# Patient Record
Sex: FEMALE
[De-identification: names, ages, dates, MRNs, and addresses within clinical notes are randomized per-mention and may not be internally consistent; named-entity substitution may affect disease eponyms.]

---

## 2018-05-02 ENCOUNTER — APPOINTMENT (OUTPATIENT)
Dept: OBGYN | Facility: CLINIC | Age: 48
End: 2018-05-02
Payer: COMMERCIAL

## 2018-05-02 VITALS
HEIGHT: 62 IN | BODY MASS INDEX: 29.26 KG/M2 | SYSTOLIC BLOOD PRESSURE: 112 MMHG | DIASTOLIC BLOOD PRESSURE: 70 MMHG | WEIGHT: 159 LBS

## 2018-05-02 DIAGNOSIS — Z87.2 PERSONAL HISTORY OF DISEASES OF THE SKIN AND SUBCUTANEOUS TISSUE: ICD-10-CM

## 2018-05-02 DIAGNOSIS — Z80.0 FAMILY HISTORY OF MALIGNANT NEOPLASM OF DIGESTIVE ORGANS: ICD-10-CM

## 2018-05-02 DIAGNOSIS — Z80.3 FAMILY HISTORY OF MALIGNANT NEOPLASM OF BREAST: ICD-10-CM

## 2018-05-02 DIAGNOSIS — Z80.42 FAMILY HISTORY OF MALIGNANT NEOPLASM OF PROSTATE: ICD-10-CM

## 2018-05-02 PROCEDURE — 36415 COLL VENOUS BLD VENIPUNCTURE: CPT

## 2018-05-02 PROCEDURE — 99396 PREV VISIT EST AGE 40-64: CPT

## 2018-05-07 LAB — CYTOLOGY CVX/VAG DOC THIN PREP: NORMAL

## 2018-06-27 ENCOUNTER — APPOINTMENT (OUTPATIENT)
Dept: HEPATOLOGY | Facility: CLINIC | Age: 48
End: 2018-06-27
Payer: COMMERCIAL

## 2018-06-27 VITALS
HEIGHT: 62 IN | WEIGHT: 157 LBS | DIASTOLIC BLOOD PRESSURE: 80 MMHG | RESPIRATION RATE: 13 BRPM | TEMPERATURE: 97.8 F | HEART RATE: 75 BPM | BODY MASS INDEX: 28.89 KG/M2 | OXYGEN SATURATION: 97 % | SYSTOLIC BLOOD PRESSURE: 121 MMHG

## 2018-06-27 DIAGNOSIS — Z86.39 PERSONAL HISTORY OF OTHER ENDOCRINE, NUTRITIONAL AND METABOLIC DISEASE: ICD-10-CM

## 2018-06-27 DIAGNOSIS — R73.03 PREDIABETES.: ICD-10-CM

## 2018-06-27 DIAGNOSIS — Q61.5 MEDULLARY CYSTIC KIDNEY: ICD-10-CM

## 2018-06-27 LAB
BASOPHILS # BLD AUTO: 0.04 K/UL
BASOPHILS NFR BLD AUTO: 0.7 %
EOSINOPHIL # BLD AUTO: 0.22 K/UL
EOSINOPHIL NFR BLD AUTO: 3.9 %
HCT VFR BLD CALC: 41.9 %
HGB BLD-MCNC: 13.7 G/DL
IMM GRANULOCYTES NFR BLD AUTO: 0.2 %
INR PPP: 0.97 RATIO
LYMPHOCYTES # BLD AUTO: 1.75 K/UL
LYMPHOCYTES NFR BLD AUTO: 31 %
MAN DIFF?: NORMAL
MCHC RBC-ENTMCNC: 28.5 PG
MCHC RBC-ENTMCNC: 32.7 GM/DL
MCV RBC AUTO: 87.1 FL
MONOCYTES # BLD AUTO: 0.56 K/UL
MONOCYTES NFR BLD AUTO: 9.9 %
NEUTROPHILS # BLD AUTO: 3.07 K/UL
NEUTROPHILS NFR BLD AUTO: 54.3 %
PLATELET # BLD AUTO: 285 K/UL
PT BLD: 11 SEC
RBC # BLD: 4.81 M/UL
RBC # FLD: 12.7 %
WBC # FLD AUTO: 5.65 K/UL

## 2018-06-27 PROCEDURE — 99204 OFFICE O/P NEW MOD 45 MIN: CPT

## 2018-06-28 LAB
25(OH)D3 SERPL-MCNC: 23.2 NG/ML
ACE BLD-CCNC: 44 U/L
AFP-TM SERPL-MCNC: 6.6 NG/ML
ALBUMIN SERPL ELPH-MCNC: 4.3 G/DL
ALP BLD-CCNC: 130 U/L
ALT SERPL-CCNC: 38 U/L
ANA SER IF-ACNC: NEGATIVE
ANION GAP SERPL CALC-SCNC: 13 MMOL/L
AST SERPL-CCNC: 32 U/L
BILIRUB SERPL-MCNC: 0.3 MG/DL
BUN SERPL-MCNC: 8 MG/DL
CALCIUM SERPL-MCNC: 9.6 MG/DL
CHLORIDE SERPL-SCNC: 103 MMOL/L
CO2 SERPL-SCNC: 22 MMOL/L
CREAT SERPL-MCNC: 0.77 MG/DL
IGA SER QL IEP: 182 MG/DL
IGG SER QL IEP: 1400 MG/DL
IGM SER QL IEP: 76 MG/DL
MITOCHONDRIA AB SER IF-ACNC: NORMAL
POTASSIUM SERPL-SCNC: 3.6 MMOL/L
PROT SERPL-MCNC: 7.6 G/DL
SMOOTH MUSCLE AB SER QL IF: NORMAL
SODIUM SERPL-SCNC: 138 MMOL/L
TTG IGA SER IA-ACNC: 12.7 UNITS
TTG IGA SER-ACNC: NEGATIVE
TTG IGG SER IA-ACNC: <5 UNITS
TTG IGG SER IA-ACNC: NEGATIVE

## 2018-06-29 LAB
ENA SS-A AB SER IA-ACNC: <0.2 AL
ENA SS-B AB SER IA-ACNC: <0.2 AL
HDV AB SER IA-ACNC: NEGATIVE
LKM AB SER QL IF: 1.5 UNITS
SOLUBLE LIVER IGG SER IA-ACNC: < 20.1 UNITS

## 2018-07-09 LAB
A1AT PHENOTYP SERPL-IMP: NORMAL BANDS
A1AT SERPL-MCNC: 147 MG/DL
HEV AB SER QL: NEGATIVE
VIT A SERPL-MCNC: 31.5 UG/DL

## 2018-08-16 ENCOUNTER — APPOINTMENT (OUTPATIENT)
Dept: HEPATOLOGY | Facility: CLINIC | Age: 48
End: 2018-08-16
Payer: COMMERCIAL

## 2018-08-16 VITALS
SYSTOLIC BLOOD PRESSURE: 107 MMHG | BODY MASS INDEX: 29.62 KG/M2 | DIASTOLIC BLOOD PRESSURE: 76 MMHG | OXYGEN SATURATION: 98 % | HEART RATE: 69 BPM | WEIGHT: 163 LBS | RESPIRATION RATE: 13 BRPM | HEIGHT: 62.4 IN | TEMPERATURE: 98.3 F

## 2018-08-16 PROCEDURE — 91200 LIVER ELASTOGRAPHY: CPT

## 2018-08-16 PROCEDURE — ZZZZZ: CPT

## 2018-08-16 PROCEDURE — 99214 OFFICE O/P EST MOD 30 MIN: CPT | Mod: 25

## 2018-10-01 ENCOUNTER — APPOINTMENT (OUTPATIENT)
Dept: HEPATOLOGY | Facility: CLINIC | Age: 48
End: 2018-10-01
Payer: COMMERCIAL

## 2018-10-01 PROCEDURE — 90471 IMMUNIZATION ADMIN: CPT

## 2018-10-01 PROCEDURE — 90632 HEPA VACCINE ADULT IM: CPT

## 2019-02-27 ENCOUNTER — APPOINTMENT (OUTPATIENT)
Dept: HEPATOLOGY | Facility: CLINIC | Age: 49
End: 2019-02-27
Payer: COMMERCIAL

## 2019-02-27 VITALS
SYSTOLIC BLOOD PRESSURE: 111 MMHG | TEMPERATURE: 98.2 F | BODY MASS INDEX: 26.13 KG/M2 | WEIGHT: 142 LBS | HEIGHT: 62 IN | RESPIRATION RATE: 17 BRPM | DIASTOLIC BLOOD PRESSURE: 74 MMHG | HEART RATE: 65 BPM

## 2019-02-27 PROCEDURE — 99213 OFFICE O/P EST LOW 20 MIN: CPT

## 2019-03-29 ENCOUNTER — APPOINTMENT (OUTPATIENT)
Dept: HEPATOLOGY | Facility: CLINIC | Age: 49
End: 2019-03-29
Payer: COMMERCIAL

## 2019-03-29 VITALS
BODY MASS INDEX: 25.76 KG/M2 | RESPIRATION RATE: 17 BRPM | WEIGHT: 140 LBS | HEIGHT: 62 IN | TEMPERATURE: 98.3 F | HEART RATE: 69 BPM | DIASTOLIC BLOOD PRESSURE: 62 MMHG | SYSTOLIC BLOOD PRESSURE: 111 MMHG

## 2019-03-29 PROCEDURE — 90471 IMMUNIZATION ADMIN: CPT

## 2019-03-29 PROCEDURE — 90632 HEPA VACCINE ADULT IM: CPT

## 2019-04-01 ENCOUNTER — APPOINTMENT (OUTPATIENT)
Dept: HEPATOLOGY | Facility: CLINIC | Age: 49
End: 2019-04-01

## 2019-08-09 ENCOUNTER — LABORATORY RESULT (OUTPATIENT)
Age: 49
End: 2019-08-09

## 2019-08-11 LAB
ALBUMIN SERPL ELPH-MCNC: 4.7 G/DL
ALP BLD-CCNC: 100 U/L
ALT SERPL-CCNC: 13 U/L
ANION GAP SERPL CALC-SCNC: 10 MMOL/L
AST SERPL-CCNC: 14 U/L
BASOPHILS # BLD AUTO: 0.04 K/UL
BASOPHILS NFR BLD AUTO: 0.8 %
BILIRUB SERPL-MCNC: 0.2 MG/DL
BUN SERPL-MCNC: 10 MG/DL
CALCIUM SERPL-MCNC: 9.8 MG/DL
CHLORIDE SERPL-SCNC: 107 MMOL/L
CO2 SERPL-SCNC: 24 MMOL/L
CREAT SERPL-MCNC: 0.79 MG/DL
EOSINOPHIL # BLD AUTO: 0.13 K/UL
EOSINOPHIL NFR BLD AUTO: 2.7 %
HCT VFR BLD CALC: 42 %
HGB BLD-MCNC: 13.3 G/DL
IMM GRANULOCYTES NFR BLD AUTO: 0.2 %
LYMPHOCYTES # BLD AUTO: 1.68 K/UL
LYMPHOCYTES NFR BLD AUTO: 34.4 %
MAN DIFF?: NORMAL
MCHC RBC-ENTMCNC: 28.1 PG
MCHC RBC-ENTMCNC: 31.7 GM/DL
MCV RBC AUTO: 88.8 FL
MONOCYTES # BLD AUTO: 0.41 K/UL
MONOCYTES NFR BLD AUTO: 8.4 %
NEUTROPHILS # BLD AUTO: 2.61 K/UL
NEUTROPHILS NFR BLD AUTO: 53.5 %
PLATELET # BLD AUTO: 267 K/UL
POTASSIUM SERPL-SCNC: 5 MMOL/L
PROT SERPL-MCNC: 7.1 G/DL
RBC # BLD: 4.73 M/UL
RBC # FLD: 12.7 %
SODIUM SERPL-SCNC: 141 MMOL/L
WBC # FLD AUTO: 4.88 K/UL

## 2019-08-12 LAB — HEPATITIS A IGG ANTIBODY: REACTIVE

## 2019-08-28 ENCOUNTER — APPOINTMENT (OUTPATIENT)
Dept: HEPATOLOGY | Facility: CLINIC | Age: 49
End: 2019-08-28
Payer: COMMERCIAL

## 2019-08-28 VITALS
HEART RATE: 71 BPM | DIASTOLIC BLOOD PRESSURE: 69 MMHG | WEIGHT: 145 LBS | RESPIRATION RATE: 16 BRPM | HEIGHT: 62 IN | SYSTOLIC BLOOD PRESSURE: 103 MMHG | BODY MASS INDEX: 26.68 KG/M2 | TEMPERATURE: 98 F

## 2019-08-28 DIAGNOSIS — R74.8 ABNORMAL LEVELS OF OTHER SERUM ENZYMES: ICD-10-CM

## 2019-08-28 PROCEDURE — 99213 OFFICE O/P EST LOW 20 MIN: CPT | Mod: 25

## 2019-08-28 PROCEDURE — 91200 LIVER ELASTOGRAPHY: CPT

## 2019-08-28 PROCEDURE — ZZZZZ: CPT

## 2019-08-28 RX ORDER — LEVOCETIRIZINE DIHYDROCHLORIDE 5 MG/1
5 TABLET ORAL
Refills: 0 | Status: DISCONTINUED | COMMUNITY
End: 2019-08-28

## 2019-08-28 RX ORDER — ERGOCALCIFEROL 1.25 MG/1
1.25 MG CAPSULE, LIQUID FILLED ORAL
Qty: 4 | Refills: 0 | Status: DISCONTINUED | COMMUNITY
Start: 2017-09-13 | End: 2019-08-28

## 2019-08-28 RX ORDER — CHOLECALCIFEROL (VITAMIN D3) 25 MCG
TABLET ORAL
Refills: 0 | Status: DISCONTINUED | COMMUNITY
End: 2019-08-28

## 2019-08-28 NOTE — CONSULT LETTER
[Dear  ___] : Dear  [unfilled], [Please see my note below.] : Please see my note below. [Courtesy Letter:] : I had the pleasure of seeing your patient, [unfilled], in my office today. [Consult Closing:] : Thank you very much for allowing me to participate in the care of this patient.  If you have any questions, please do not hesitate to contact me. [FreeTextEntry2] : Dr. Dowd [FreeTextEntry3] : Sincerely,\par \par Delio Hinojosa M.D., Ph.D.\par [facility]\par [address]\[place] [address]\par Tel: [phone]\par Fax: [phone]\par Cell: [phone]\par E-mail: [email or web address]\par

## 2019-08-28 NOTE — ASSESSMENT
[FreeTextEntry1] : 50 yo F with pre-diabetes, overweight BMI, and hypothyroidism, with nonalcoholic fatty liver (NAFL), diagnosed based on mildly elevated alkaline phosphatase on prior labs, sonographic evidence of hepatic steatosis (on US done on 18), and prior FibroScan (done on 18) that was consistent with severe steatosis without significant hepatic fibrosis. She has a family history notable for a mother that  of cirrhosis and a sister with "fatty liver".\par \par Laboratory work-up for other etiologies of chronic liver disease was all negative.\par \par She has lost weight since her initial diagnosis with diet and exercise, and her alkaline phosphatase has since normalized. She also has evidence of decreased severity of hepatic steatosis based on her follow-up FibroScan done today. I congratulated her on those achievements.\par \par I have discussed with her at length regarding nonalcoholic fatty liver disease (NAFLD). I reviewed the natural history, evaluation and staging of the disease including her prior and recent FibroScan results, and prognosis, including possible risks of development of nonalcoholic steatohepatitis (BELLE), compensated cirrhosis, decompensated cirrhosis, and HCC.\par \par I have discussed with her that lifestyle modifications are crucial for management of NAFLD. I encouraged her to continue to gradually lose weight (especially given her recent 5-lb weight gain) with a goal weight of 135 lbs. She can continue her preferred "keto" diet but I also encouraged her to consider a Mediterranean-style diet. She should continue moderate intensity exercise for 20 minutes at least 3 times per week. These changes have been shown to lead to regression or even resolution of steatosis, inflammation, and even fibrosis in some patients.\par \par I have reviewed with her the fact that currently, there are no FDA-approved pharmacologic treatments for NAFLD, but that this may change within the next 1-2 years as a number of promising drugs are currently being investigated in clinical trials. She is not currently a candidate for any enrolling clinical trials given the minimal fibrosis suggested based on her FibroScan, as well as normal liver tests. She will continue vitamin E therapy for now.\par \par She is now immune to HAV. Prior labs from 3/2018 showed HBsAg negative and HBsAb negative. Will check HBcAb with next labs and, if non-immune, she will be offered the vaccine series.\par \par Ms. INTERIANO was counseled to: abstain from alcohol and all illicit drugs; avoid use of herbal and dietary supplements due to potential hepatotoxicity; and limit use of acetaminophen to <2 grams per day.\par \par She will return for follow-up and repeat FibroScan in 6 months.\par

## 2019-08-28 NOTE — HISTORY OF PRESENT ILLNESS
[de-identified] : Ms. Gabriel is a 50 yo F with pre-diabetes, overweight BMI, and hypothyroidism, who is being seen for follow-up of nonalcoholic fatty liver (NAFL), diagnosed based on mildly elevated alkaline phosphatase on labs, sonographic evidence of hepatic steatosis (on US done on 18), and prior FibroScan (done on 18) that was consistent with severe steatosis without significant hepatic fibrosis. She has a family history notable for a mother that  of cirrhosis and a sister with "fatty liver".\par \par FibroScan (18): median liver stiffness of 5.6 kPA (consistent with F0-1 fibrosis), CAP score 322 dB/m (consistent with S3 steatosis)\par \par She was last seen on 19 and is here for follow-up. She successfully lost almost 20 lbs since 2018 (when she weighed 159 lbs), to low weight of 140 lbs on 3/29/19. She has gained 5 lbs since then, which she attributes to not adhering to her diet while on summer vacation. Previously, she had been adhering to the "keto diet" including fasting in the mornings, only eating during certain hours, and avoiding sugary foods and carbohydrates. She had also been exercising regularly. She completed her HAV vaccine series on 3/29/19.\par \par She underwent FibroScan today that showed median liver stiffness of 4.5 kPA (consistent with F0-1 fibrosis) and CAP score of 270 dB/m (consistent with S2 steatosis).

## 2020-07-21 ENCOUNTER — APPOINTMENT (OUTPATIENT)
Dept: HEPATOLOGY | Facility: CLINIC | Age: 50
End: 2020-07-21

## 2020-07-21 ENCOUNTER — APPOINTMENT (OUTPATIENT)
Dept: HEPATOLOGY | Facility: CLINIC | Age: 50
End: 2020-07-21
Payer: COMMERCIAL

## 2020-07-21 PROCEDURE — 91200 LIVER ELASTOGRAPHY: CPT

## 2020-07-28 ENCOUNTER — LABORATORY RESULT (OUTPATIENT)
Age: 50
End: 2020-07-28

## 2020-08-31 ENCOUNTER — APPOINTMENT (OUTPATIENT)
Dept: HEPATOLOGY | Facility: CLINIC | Age: 50
End: 2020-08-31
Payer: COMMERCIAL

## 2020-08-31 VITALS
BODY MASS INDEX: 26.68 KG/M2 | WEIGHT: 145 LBS | RESPIRATION RATE: 16 BRPM | HEART RATE: 89 BPM | TEMPERATURE: 98 F | SYSTOLIC BLOOD PRESSURE: 130 MMHG | HEIGHT: 62 IN | DIASTOLIC BLOOD PRESSURE: 70 MMHG

## 2020-08-31 PROCEDURE — 99213 OFFICE O/P EST LOW 20 MIN: CPT

## 2020-08-31 NOTE — REVIEW OF SYSTEMS
[Negative] : Heme/Lymph [As Noted in HPI] : as noted in HPI [Recent Weight Gain (___ Lbs)] : recent [unfilled] ~Ulb weight gain

## 2020-08-31 NOTE — CONSULT LETTER
[Dear  ___] : Dear  [unfilled], [Courtesy Letter:] : I had the pleasure of seeing your patient, [unfilled], in my office today. [Please see my note below.] : Please see my note below. [Consult Closing:] : Thank you very much for allowing me to participate in the care of this patient.  If you have any questions, please do not hesitate to contact me. [FreeTextEntry2] : Dr. Carlyn Dowd [FreeTextEntry3] : Sincerely,\par \par Delio Hinojosa M.D., Ph.D.\par [facility]\par [address]\[place] [address]\par Tel: [phone]\par Fax: [phone]\par Cell: [phone]\par E-mail: [email or web address]\par

## 2020-08-31 NOTE — HISTORY OF PRESENT ILLNESS
[de-identified] : Ms. Gabriel is a 51 yo F with pre-diabetes, overweight BMI, and hypothyroidism, who is being seen for follow-up of nonalcoholic fatty liver disease (NAFLD), diagnosed based on mildly elevated alkaline phosphatase on prior labs, sonographic evidence of hepatic steatosis (on US done on 18), and prior FibroScan results. She has a family history notable for a mother that  of cirrhosis and a sister with "fatty liver".\par \par FibroScan (18): median liver stiffness of 5.6 kPA (consistent with F0-1 fibrosis), CAP score 322 dB/m (consistent with S3 steatosis, using cut-offs at that time)\par \par FibroScan (19): median liver stiffness of 4.5 kPA (consistent with F0-1 fibrosis), CAP score of 270 dB/M (consistent with S2 steatosis, using cut-offs at that time)\par \par She was last seen on 19. She regained a few pounds during the COVID- pandemic, as she has not been adhering to her prior "keto diet" as consistently. She has not been exercising, though she has a "home gym".\par \par She recently underwent FibroScan on 20 that showed median liver stiffness of 3.0 kPA (consistent with F0-1 fibrosis) and CAP score of 275 dB/m (consistent with S0 steatosis using updated cut-offs).

## 2020-08-31 NOTE — ASSESSMENT
[FreeTextEntry1] : 51 yo F with pre-diabetes, overweight BMI, and hypothyroidism, with family history of liver disease and with evidence of nonalcoholic fatty liver disease (NAFLD) based on prior liver enzyme elevations, US with hepatic steatosis, and FibroScan results. Laboratory work-up for other etiologies of chronic liver disease was all negative.\par \par With lifestyle modifications, her prior alkaline phosphatase elevations have normalized, including on her most recent labs from 7/21/20. Her liver synthetic function remains normal and she does not have any significant hepatic fibrosis based on liver stiffness by FibroScans. She also has evidence of decreased severity of hepatic steatosis based on her follow-up FibroScans (compared to 2018), though I cautioned her that she may still have some degree of hepatic steatosis present (likely <10%) despite her most recent FibroScan result and the updated cut-off for CAP score that is now being used. I have ordered an abdominal US today for re-evaluation for hepatic steatosis.\par \par We discussed the natural history, evaluation and staging of the disease including FibroScan, and prognosis, including possible risks of development of compensated cirrhosis, decompensated cirrhosis, and hepatocellular carcinoma (HCC) as well as increased risks of cardiovascular disease and non-hepatic malignancies.\par \par I encouraged her to re-commit to resuming her previously-successful lifestyle modifications, which are crucial for management of NAFLD. I recommended gradual weight loss of 5 lbs. I recommended dietary changes including coffee consumption (up to 3-4 cups/day if desired), adherence to a Mediterranean style diet with increased consumption of vegetables and lean proteins, avoidance of red meat and high fructose corn syrup, and avoidance of calorie-containing beverages. I recommended moderate intensity exercise for a minimum of 20-30 minutes at least 3 times per week. These changes have been shown to lead to regression or even resolution of steatosis, inflammation, and even fibrosis in some patients.\par \par She is now immune to HAV. She is non-immune to HBV.\par \par Ms. INTERIANO was counseled to: abstain from alcohol and all illicit drugs; avoid use of herbal and dietary supplements due to potential hepatotoxicity; and limit use of acetaminophen to <2 grams per day.\par \par She will return for follow-up and repeat FibroScan in 1 year, with labs in 6 months and again at next visit.

## 2021-01-20 ENCOUNTER — APPOINTMENT (OUTPATIENT)
Dept: OBGYN | Facility: CLINIC | Age: 51
End: 2021-01-20
Payer: COMMERCIAL

## 2021-01-20 VITALS
BODY MASS INDEX: 27.6 KG/M2 | DIASTOLIC BLOOD PRESSURE: 80 MMHG | SYSTOLIC BLOOD PRESSURE: 110 MMHG | HEIGHT: 62 IN | WEIGHT: 150 LBS

## 2021-01-20 PROCEDURE — 99072 ADDL SUPL MATRL&STAF TM PHE: CPT

## 2021-01-20 PROCEDURE — 99396 PREV VISIT EST AGE 40-64: CPT

## 2021-01-25 LAB — CYTOLOGY CVX/VAG DOC THIN PREP: NORMAL

## 2021-03-16 ENCOUNTER — OUTPATIENT (OUTPATIENT)
Dept: OUTPATIENT SERVICES | Facility: HOSPITAL | Age: 51
LOS: 1 days | End: 2021-03-16
Payer: COMMERCIAL

## 2021-03-16 ENCOUNTER — APPOINTMENT (OUTPATIENT)
Dept: ULTRASOUND IMAGING | Facility: CLINIC | Age: 51
End: 2021-03-16

## 2021-03-16 ENCOUNTER — TRANSCRIPTION ENCOUNTER (OUTPATIENT)
Age: 51
End: 2021-03-16

## 2021-03-16 ENCOUNTER — RESULT REVIEW (OUTPATIENT)
Age: 51
End: 2021-03-16

## 2021-03-16 DIAGNOSIS — K76.0 FATTY (CHANGE OF) LIVER, NOT ELSEWHERE CLASSIFIED: ICD-10-CM

## 2021-03-16 PROCEDURE — 76700 US EXAM ABDOM COMPLETE: CPT | Mod: 26

## 2021-03-16 PROCEDURE — 76700 US EXAM ABDOM COMPLETE: CPT

## 2021-07-08 ENCOUNTER — NON-APPOINTMENT (OUTPATIENT)
Age: 51
End: 2021-07-08

## 2021-07-12 ENCOUNTER — NON-APPOINTMENT (OUTPATIENT)
Age: 51
End: 2021-07-12

## 2021-07-19 ENCOUNTER — NON-APPOINTMENT (OUTPATIENT)
Age: 51
End: 2021-07-19

## 2021-07-20 ENCOUNTER — APPOINTMENT (OUTPATIENT)
Dept: OBGYN | Facility: CLINIC | Age: 51
End: 2021-07-20
Payer: COMMERCIAL

## 2021-07-20 VITALS
SYSTOLIC BLOOD PRESSURE: 100 MMHG | DIASTOLIC BLOOD PRESSURE: 66 MMHG | BODY MASS INDEX: 27.6 KG/M2 | WEIGHT: 150 LBS | HEIGHT: 62 IN

## 2021-07-20 DIAGNOSIS — N92.1 EXCESSIVE AND FREQUENT MENSTRUATION WITH IRREGULAR CYCLE: ICD-10-CM

## 2021-07-20 DIAGNOSIS — N92.6 IRREGULAR MENSTRUATION, UNSPECIFIED: ICD-10-CM

## 2021-07-20 PROCEDURE — 58100 BIOPSY OF UTERUS LINING: CPT

## 2021-07-20 PROCEDURE — 99213 OFFICE O/P EST LOW 20 MIN: CPT | Mod: 25

## 2021-07-26 PROBLEM — N92.1 MENOMETRORRHAGIA: Status: ACTIVE | Noted: 2021-07-26

## 2021-07-29 LAB — CORE LAB BIOPSY: NORMAL

## 2022-03-07 ENCOUNTER — APPOINTMENT (OUTPATIENT)
Dept: OBGYN | Facility: CLINIC | Age: 52
End: 2022-03-07

## 2022-03-08 ENCOUNTER — APPOINTMENT (OUTPATIENT)
Dept: OBGYN | Facility: CLINIC | Age: 52
End: 2022-03-08
Payer: COMMERCIAL

## 2022-03-08 VITALS
BODY MASS INDEX: 23.74 KG/M2 | DIASTOLIC BLOOD PRESSURE: 76 MMHG | HEIGHT: 62 IN | WEIGHT: 129 LBS | SYSTOLIC BLOOD PRESSURE: 118 MMHG

## 2022-03-08 DIAGNOSIS — Z01.419 ENCOUNTER FOR GYNECOLOGICAL EXAMINATION (GENERAL) (ROUTINE) W/OUT ABNORMAL FINDINGS: ICD-10-CM

## 2022-03-08 PROCEDURE — 99396 PREV VISIT EST AGE 40-64: CPT

## 2022-03-08 RX ORDER — ASCORBIC ACID 500 MG
400 TABLET ORAL
Refills: 0 | Status: DISCONTINUED | COMMUNITY
Start: 2019-02-27 | End: 2022-03-08

## 2022-03-14 LAB — CYTOLOGY CVX/VAG DOC THIN PREP: NORMAL

## 2022-06-21 ENCOUNTER — RESULT REVIEW (OUTPATIENT)
Age: 52
End: 2022-06-21

## 2022-07-15 ENCOUNTER — APPOINTMENT (OUTPATIENT)
Dept: HEPATOLOGY | Facility: CLINIC | Age: 52
End: 2022-07-15

## 2022-07-15 VITALS
TEMPERATURE: 97.3 F | WEIGHT: 128 LBS | BODY MASS INDEX: 23.55 KG/M2 | HEIGHT: 62 IN | DIASTOLIC BLOOD PRESSURE: 67 MMHG | OXYGEN SATURATION: 100 % | RESPIRATION RATE: 16 BRPM | SYSTOLIC BLOOD PRESSURE: 112 MMHG | HEART RATE: 68 BPM

## 2022-07-15 DIAGNOSIS — E66.3 OVERWEIGHT: ICD-10-CM

## 2022-07-15 LAB
ALBUMIN SERPL ELPH-MCNC: 4.8 G/DL
ALP BLD-CCNC: 90 U/L
ALT SERPL-CCNC: 17 U/L
ANION GAP SERPL CALC-SCNC: 9 MMOL/L
AST SERPL-CCNC: 20 U/L
BASOPHILS # BLD AUTO: 0.04 K/UL
BASOPHILS NFR BLD AUTO: 0.8 %
BILIRUB SERPL-MCNC: 0.3 MG/DL
BUN SERPL-MCNC: 14 MG/DL
CALCIUM SERPL-MCNC: 10 MG/DL
CHLORIDE SERPL-SCNC: 100 MMOL/L
CHOLEST SERPL-MCNC: 249 MG/DL
CO2 SERPL-SCNC: 28 MMOL/L
COVID-19 SPIKE DOMAIN ANTIBODY INTERPRETATION: POSITIVE
CREAT SERPL-MCNC: 0.76 MG/DL
EGFR: 93 ML/MIN/1.73M2
EOSINOPHIL # BLD AUTO: 0.2 K/UL
EOSINOPHIL NFR BLD AUTO: 4.2 %
GGT SERPL-CCNC: 16 U/L
HCT VFR BLD CALC: 41.9 %
HDLC SERPL-MCNC: 70 MG/DL
HGB BLD-MCNC: 14.1 G/DL
IMM GRANULOCYTES NFR BLD AUTO: 0.2 %
INR PPP: 1.06 RATIO
LDLC SERPL CALC-MCNC: 154 MG/DL
LYMPHOCYTES # BLD AUTO: 1.79 K/UL
LYMPHOCYTES NFR BLD AUTO: 37.4 %
MAN DIFF?: NORMAL
MCHC RBC-ENTMCNC: 29.1 PG
MCHC RBC-ENTMCNC: 33.7 GM/DL
MCV RBC AUTO: 86.4 FL
MONOCYTES # BLD AUTO: 0.44 K/UL
MONOCYTES NFR BLD AUTO: 9.2 %
NEUTROPHILS # BLD AUTO: 2.31 K/UL
NEUTROPHILS NFR BLD AUTO: 48.2 %
NONHDLC SERPL-MCNC: 179 MG/DL
PLATELET # BLD AUTO: 273 K/UL
POTASSIUM SERPL-SCNC: 3.9 MMOL/L
PROT SERPL-MCNC: 7.6 G/DL
PT BLD: 12.3 SEC
RBC # BLD: 4.85 M/UL
RBC # FLD: 12.7 %
SARS-COV-2 AB SERPL IA-ACNC: >250 U/ML
SODIUM SERPL-SCNC: 138 MMOL/L
TRIGL SERPL-MCNC: 126 MG/DL
WBC # FLD AUTO: 4.79 K/UL

## 2022-07-15 PROCEDURE — 99215 OFFICE O/P EST HI 40 MIN: CPT

## 2022-07-15 PROCEDURE — 91200 LIVER ELASTOGRAPHY: CPT

## 2022-07-15 RX ORDER — CHOLECALCIFEROL (VITAMIN D3) 1250 MCG
1.25 MG CAPSULE ORAL
Qty: 12 | Refills: 0 | Status: DISCONTINUED | COMMUNITY
Start: 2020-06-25 | End: 2022-07-15

## 2022-07-15 RX ORDER — LEVOTHYROXINE SODIUM 137 UG/1
TABLET ORAL
Refills: 0 | Status: ACTIVE | COMMUNITY

## 2022-07-15 RX ORDER — TRIAMTERENE AND HYDROCHLOROTHIAZIDE 37.5; 25 MG/1; MG/1
CAPSULE ORAL
Refills: 0 | Status: ACTIVE | COMMUNITY

## 2022-07-15 NOTE — ASSESSMENT
[FreeTextEntry1] : Ms. DERRICK INTERIANO is 52 year old female who is being seen for follow up visit to discuss the medications suggested by the ONC MD Tamoxifen with PH/O NAFLD.\par \par Recently was suggested by the Onc MD of tamoxifen for 5 years as she was diagnosed with Right breast BI-RADS 4 on 05/05/22.\par \par # Tamoxifen has been associated with rare instances of idiosyncratic, clinically apparent liver injury, typically arising within the first six months of treatment and having variable presentations with cholestatic, mixed or hepatocellular pattern of enzyme elevations. Immunoallergic features (fever, rash, and eosinophilia) are uncommon, as are autoantibodies. Some instances have been severe with signs of hepatic failure, but most cases are self-limited. \par \par >> Several instances of cirrhosis have been described after therapy with tamoxifen for 3 to 5 years. Serum aminotransferase elevations and fatty liver generally improve once tamoxifen is stopped, but the improvement may be slow and in rare instances, signs and symptoms of portal hypertension persist.\par >> Liver Tox – Reviewed the Likelihood score: B (highly likely but rare cause of clinically apparent liver injury). Advised to call back to discuss closer follow-ups if decides to start it.\par \par # Nonalcoholic fatty liver disease (NAFLD) based on prior liver enzyme elevations, US with hepatic steatosis, and Fibroscan results. Laboratory work-up for other etiologies of chronic liver disease was all negative.\par +Risk factors - Pre-diabetes, and hypothyroidism, with family history of liver disease and NAFLD.\par \par # Elevated Liver Enzymes - With lifestyle modifications, her prior alkaline phosphatase elevations have normalized, including on her most recent labs from 05/2022. \par >> Fibrosis – Ordered for US and FS to quantify the Steatosis and Fibrosis. \par We discussed the natural history, evaluation and staging of the disease including Fibroscan, and prognosis, including possible risks of development of compensated cirrhosis, decompensated cirrhosis, and hepatocellular carcinoma (HCC) as well as increased risks of cardiovascular disease and non-hepatic malignancies.\par \par # Immunity - Immune to HAV (08/9/2019) non-immune to HBV.\par \par PLAN to follow-up with repeat labs in 6 months. POC conversation with spouse involved in the room.\par Encouraged to call back in the interim with any issues or concerns so that we can address and assist as required. \par

## 2022-07-15 NOTE — HISTORY OF PRESENT ILLNESS
[de-identified] : Ms. Gabriel is a 53 yo F with who is being seen for follow-up of nonalcoholic fatty liver disease (NAFLD) She is normal weight with BMI of 23.41. She has not been adhering to her prior "keto diet" as consistently. She has not been exercising, though she has a "home gym".\par \par Recently was suggested by the Onc MD of tamoxifen for 5 years as she was diagnosed with Right breast BI-RADS 4 on 22.\par \par MH/o pre-diabetes, overweight BMI, and hypothyroidism, last seen on 2020. \par PH/o diagnosed based on mildly elevated alkaline phosphatase on prior labs, sonographic evidence of hepatic steatosis (on US done on 18), and prior Fibroscan results. \par Family history of mother that  of cirrhosis and a sister with "fatty liver".\par \par Fibroscan 20 3.0 kPA (F0-1 fibrosis)  dB/m (S0 steatosis)\par Fibroscan (19): 4.5 kPA (F0-1 fibrosis),  dB/M (S2 steatosis)\par Fibroscan (18): 5.6 kPA (F0-1 fibrosis),  dB/m (S3 steatosis)\par \par Labs on 22 shows WDL- TSH/T4, A1C 5.5%, CBC, CMP, Lipids, On 22 shows Low WBC 4.2, WDL- CBC, CMP, A1C and Lipids, Vit D.\par \par Additional Providers: Dr. Mellissa Mar, ONC Dr. Aidee Javed @ [facility].\par

## 2022-07-15 NOTE — PHYSICAL EXAM
[Scleral Icterus] : No Scleral Icterus [Abdominal  Ascites] : no ascites [Asterixis] : no asterixis observed [Jaundice] : No jaundice [Palmar Erythema] : no Palmar Erythema [Depression] : no depression [General Appearance - Alert] : alert [General Appearance - Well Nourished] : well nourished [Sclera] : the sclera and conjunctiva were normal [Respiration, Rhythm And Depth] : normal respiratory rhythm and effort [Heart Rate And Rhythm] : heart rate was normal and rhythm regular [Heart Sounds] : normal S1 and S2 [Edema] : there was no peripheral edema [Bowel Sounds] : normal bowel sounds [Cervical Lymph Nodes Enlarged Posterior Bilaterally] : posterior cervical [Supraclavicular Lymph Nodes Enlarged Bilaterally] : supraclavicular [No CVA Tenderness] : no ~M costovertebral angle tenderness [Abnormal Walk] : normal gait [Nail Clubbing] : no clubbing  or cyanosis of the fingernails [] : no rash [Skin Lesions] : no skin lesions [No Focal Deficits] : no focal deficits [Oriented To Time, Place, And Person] : oriented to person, place, and time

## 2022-07-18 LAB
25(OH)D3 SERPL-MCNC: 31.2 NG/ML
ESTIMATED AVERAGE GLUCOSE: 120 MG/DL
FOLATE SERPL-MCNC: 18.3 NG/ML
HBA1C MFR BLD HPLC: 5.8 %
SMOOTH MUSCLE AB SER QL IF: NORMAL
TSH SERPL-ACNC: 0.87 UIU/ML
VIT B12 SERPL-MCNC: 246 PG/ML

## 2022-07-20 ENCOUNTER — APPOINTMENT (OUTPATIENT)
Dept: OBGYN | Facility: CLINIC | Age: 52
End: 2022-07-20

## 2022-07-20 PROCEDURE — 99213 OFFICE O/P EST LOW 20 MIN: CPT

## 2022-07-25 ENCOUNTER — APPOINTMENT (OUTPATIENT)
Dept: ULTRASOUND IMAGING | Facility: CLINIC | Age: 52
End: 2022-07-25

## 2022-07-25 PROCEDURE — 76700 US EXAM ABDOM COMPLETE: CPT

## 2023-01-18 ENCOUNTER — APPOINTMENT (OUTPATIENT)
Dept: HEPATOLOGY | Facility: CLINIC | Age: 53
End: 2023-01-18
Payer: COMMERCIAL

## 2023-01-18 VITALS
RESPIRATION RATE: 16 BRPM | DIASTOLIC BLOOD PRESSURE: 71 MMHG | BODY MASS INDEX: 24.66 KG/M2 | TEMPERATURE: 97.8 F | OXYGEN SATURATION: 98 % | HEIGHT: 62 IN | WEIGHT: 134 LBS | HEART RATE: 68 BPM | SYSTOLIC BLOOD PRESSURE: 107 MMHG

## 2023-01-18 DIAGNOSIS — K76.0 FATTY (CHANGE OF) LIVER, NOT ELSEWHERE CLASSIFIED: ICD-10-CM

## 2023-01-18 DIAGNOSIS — E78.5 HYPERLIPIDEMIA, UNSPECIFIED: ICD-10-CM

## 2023-01-18 LAB
ALBUMIN SERPL ELPH-MCNC: 4.7 G/DL
ALP BLD-CCNC: 84 U/L
ALT SERPL-CCNC: 17 U/L
ANION GAP SERPL CALC-SCNC: 11 MMOL/L
AST SERPL-CCNC: 20 U/L
BILIRUB SERPL-MCNC: 0.3 MG/DL
BUN SERPL-MCNC: 12 MG/DL
CALCIUM SERPL-MCNC: 9.7 MG/DL
CHLORIDE SERPL-SCNC: 103 MMOL/L
CHOLEST SERPL-MCNC: 248 MG/DL
CO2 SERPL-SCNC: 25 MMOL/L
CREAT SERPL-MCNC: 0.8 MG/DL
EGFR: 88 ML/MIN/1.73M2
HDLC SERPL-MCNC: 64 MG/DL
INR PPP: 1.03 RATIO
LDLC SERPL CALC-MCNC: 151 MG/DL
NONHDLC SERPL-MCNC: 184 MG/DL
POTASSIUM SERPL-SCNC: 4.7 MMOL/L
PROT SERPL-MCNC: 7.4 G/DL
PT BLD: 12 SEC
SODIUM SERPL-SCNC: 139 MMOL/L
TRIGL SERPL-MCNC: 165 MG/DL

## 2023-01-18 PROCEDURE — 99215 OFFICE O/P EST HI 40 MIN: CPT

## 2023-01-18 RX ORDER — MULTIVIT-MIN/FOLIC/VIT K/LYCOP 400-300MCG
25 MCG TABLET ORAL
Refills: 0 | Status: DISCONTINUED | COMMUNITY
End: 2023-01-18

## 2023-01-18 NOTE — PHYSICAL EXAM
[General Appearance - Alert] : alert [General Appearance - Well Nourished] : well nourished [Sclera] : the sclera and conjunctiva were normal [Respiration, Rhythm And Depth] : normal respiratory rhythm and effort [Heart Rate And Rhythm] : heart rate was normal and rhythm regular [Heart Sounds] : normal S1 and S2 [Edema] : there was no peripheral edema [Bowel Sounds] : normal bowel sounds [Cervical Lymph Nodes Enlarged Posterior Bilaterally] : posterior cervical [Supraclavicular Lymph Nodes Enlarged Bilaterally] : supraclavicular [No CVA Tenderness] : no ~M costovertebral angle tenderness [Abnormal Walk] : normal gait [Nail Clubbing] : no clubbing  or cyanosis of the fingernails [] : no rash [Skin Lesions] : no skin lesions [No Focal Deficits] : no focal deficits [Oriented To Time, Place, And Person] : oriented to person, place, and time [Scleral Icterus] : No Scleral Icterus [Abdominal  Ascites] : no ascites [Asterixis] : no asterixis observed [Jaundice] : No jaundice [Palmar Erythema] : no Palmar Erythema [Depression] : no depression

## 2023-01-18 NOTE — HISTORY OF PRESENT ILLNESS
[de-identified] : Fibroscan on 01/18/2023 shows F0-1 (E kPa) and S0 (CAP dB/m) [FreeTextEntry1] : Ms. DERRICK INTERIANO is 52 year old female who is being seen for follow up visit with nonalcoholic fatty liver disease (NAFLD) she is normal weight with BMI of 24.5. She has not been adhering to her prior "keto diet" as consistently. She has not been exercising, though she has a "home gym".\par \par Recently was suggested by the Onc MD of tamoxifen for 5 years as she was diagnosed with Right breast BI-RADS 4 on 22. She c/t monitor with GYN without any medications.\par \par MH/o pre-diabetes, overweight BMI, and hypothyroidism. \par PH/o diagnosed based on mildly elevated alkaline phosphatase on prior labs, sonographic evidence of hepatic steatosis (on US done on 18), and prior Fibroscan results. \par Family history of mother that  of cirrhosis and a sister with "fatty liver".\par \par Fibroscan on 07/15/2023: F0-1 (E 3.2 kPa) and S0 ( dB/m)\par Fibroscan 20: 3.0 kPA (F0-1 fibrosis)  dB/m (S0 steatosis)\par Fibroscan (19): 4.5 kPA (F0-1 fibrosis),  dB/M (S2 steatosis)\par Fibroscan (18): 5.6 kPA (F0-1 fibrosis),  dB/m (S3 steatosis)\par \par Labs on 2023: WDL- CMP, Tgl/Cho 165/248, /non . Hepatic steatosis+ US Ab on 2022, Diffusely echogenic medullary region on B/L kidneys.\par  \par Labs on 22 shows WDL- TSH/T4, A1C 5.5%, CBC, CMP, Lipids, On 22 shows Low WBC 4.2, WDL- CBC, CMP, A1C and Lipids, Vit D.\par \par Additional Providers: Dr. Mellissa Mar, ONC @ [facility], GYN Dr. Aidee Javed.\par

## 2023-01-18 NOTE — ASSESSMENT
[FreeTextEntry1] : Ms. DERRICK INTERIANO is 52 year old female who is being seen for follow up visit to discuss the medications suggested by the ONC MD Tamoxifen with PH/O NAFLD.\par \par Fibroscan on 07/15/2023: F0-1 (E 3.2 kPa) and S0 ( dB/m)\par Labs on 01/18/2023: WDL- CMP, Tgl/Cho 165/248, /non .\par \par # Fatty Liver – not scored in the FS but + in US ab. Ordered MRE which is more sensitive to the quantification of the steatosis /fibrosis. \par + Hyperlipidemia noted.\par Laboratory work-up for other etiologies of chronic liver disease was all negative.\par \par Suggested by the Onc MD of tamoxifen for 5 years as she was diagnosed with Right breast BI-RADS 4 on 05/05/22.\par # Tamoxifen has been associated with rare instances of idiosyncratic, clinically apparent liver injury, typically arising within the first six months of treatment and having variable presentations with cholestatic, mixed or hepatocellular pattern of enzyme elevations. Immunoallergic features (fever, rash, and eosinophilia) are uncommon, as are autoantibodies. Some instances have been severe with signs of hepatic failure, but most cases are self-limited. \par > Several instances of cirrhosis have been described after therapy with tamoxifen for 3 to 5 years. Serum aminotransferase elevations and fatty liver generally improve once tamoxifen is stopped, but the improvement may be slow and in rare instances, signs and symptoms of portal hypertension persist.\par > Liver Tox – Reviewed the Likelihood score: B (highly likely but rare cause of clinically apparent liver injury). Advised to call back to discuss closer follow-ups if decides to start it.\par \par # Elevated Liver Enzymes - With lifestyle modifications, her prior alkaline phosphatase elevations have normalized, including on her most recent labs from 05/2022. \par > Fibrosis – Ordered for US and FS to quantify the Steatosis and Fibrosis. \par We discussed the natural history, evaluation and staging of the disease including Fibroscan, and prognosis, including possible risks of development of compensated cirrhosis, decompensated cirrhosis, and hepatocellular carcinoma (HCC) as well as increased risks of cardiovascular disease and non- hepatic malignancies.\par \par # Immunity - Immune to HAV (08/9/2019) non-immune to HBV. Advised to get vaccinated with PCP and let us know when completed to re-check for antibodies in 3 months from then.\par \par PLAN to follow-up with repeat labs in 6 months. POC conversation with spouse involved in the room.\par Encouraged to call back in the interim with any issues or concerns so that we can address and assist as required.\par

## 2023-01-19 LAB
BASOPHILS # BLD AUTO: 0.04 K/UL
BASOPHILS NFR BLD AUTO: 0.8 %
EOSINOPHIL # BLD AUTO: 0.25 K/UL
EOSINOPHIL NFR BLD AUTO: 4.7 %
ESTIMATED AVERAGE GLUCOSE: 114 MG/DL
HBA1C MFR BLD HPLC: 5.6 %
HCT VFR BLD CALC: 42.4 %
HGB BLD-MCNC: 14.2 G/DL
IMM GRANULOCYTES NFR BLD AUTO: 0.2 %
LYMPHOCYTES # BLD AUTO: 1.52 K/UL
LYMPHOCYTES NFR BLD AUTO: 28.8 %
MAN DIFF?: NORMAL
MCHC RBC-ENTMCNC: 29.2 PG
MCHC RBC-ENTMCNC: 33.5 GM/DL
MCV RBC AUTO: 87.1 FL
MONOCYTES # BLD AUTO: 0.5 K/UL
MONOCYTES NFR BLD AUTO: 9.5 %
NEUTROPHILS # BLD AUTO: 2.95 K/UL
NEUTROPHILS NFR BLD AUTO: 56 %
PLATELET # BLD AUTO: 267 K/UL
RBC # BLD: 4.87 M/UL
RBC # FLD: 12.6 %
WBC # FLD AUTO: 5.27 K/UL

## 2023-03-02 ENCOUNTER — OUTPATIENT (OUTPATIENT)
Dept: OUTPATIENT SERVICES | Facility: HOSPITAL | Age: 53
LOS: 1 days | End: 2023-03-02
Payer: COMMERCIAL

## 2023-03-02 ENCOUNTER — RESULT REVIEW (OUTPATIENT)
Age: 53
End: 2023-03-02

## 2023-03-02 ENCOUNTER — APPOINTMENT (OUTPATIENT)
Dept: MRI IMAGING | Facility: IMAGING CENTER | Age: 53
End: 2023-03-02
Payer: COMMERCIAL

## 2023-03-02 DIAGNOSIS — K76.0 FATTY (CHANGE OF) LIVER, NOT ELSEWHERE CLASSIFIED: ICD-10-CM

## 2023-03-02 DIAGNOSIS — Z00.8 ENCOUNTER FOR OTHER GENERAL EXAMINATION: ICD-10-CM

## 2023-03-02 PROCEDURE — 76391 MR ELASTOGRAPHY: CPT

## 2023-03-02 PROCEDURE — 74183 MRI ABD W/O CNTR FLWD CNTR: CPT | Mod: 26

## 2023-03-02 PROCEDURE — 74183 MRI ABD W/O CNTR FLWD CNTR: CPT

## 2023-03-02 PROCEDURE — A9585: CPT

## 2023-03-02 PROCEDURE — 76391 MR ELASTOGRAPHY: CPT | Mod: 26

## 2023-03-08 ENCOUNTER — NON-APPOINTMENT (OUTPATIENT)
Age: 53
End: 2023-03-08

## 2023-05-22 ENCOUNTER — APPOINTMENT (OUTPATIENT)
Dept: OBGYN | Facility: CLINIC | Age: 53
End: 2023-05-22
Payer: COMMERCIAL

## 2023-05-22 VITALS
BODY MASS INDEX: 23.92 KG/M2 | HEIGHT: 62 IN | WEIGHT: 130 LBS | DIASTOLIC BLOOD PRESSURE: 70 MMHG | SYSTOLIC BLOOD PRESSURE: 118 MMHG

## 2023-05-22 DIAGNOSIS — Z80.0 FAMILY HISTORY OF MALIGNANT NEOPLASM OF DIGESTIVE ORGANS: ICD-10-CM

## 2023-05-22 DIAGNOSIS — N60.99 UNSPECIFIED BENIGN MAMMARY DYSPLASIA OF UNSPECIFIED BREAST: ICD-10-CM

## 2023-05-22 DIAGNOSIS — Z01.411 ENCOUNTER FOR GYNECOLOGICAL EXAMINATION (GENERAL) (ROUTINE) WITH ABNORMAL FINDINGS: ICD-10-CM

## 2023-05-22 DIAGNOSIS — N94.3 PREMENSTRUAL TENSION SYNDROME: ICD-10-CM

## 2023-05-22 DIAGNOSIS — N64.89 OTHER SPECIFIED DISORDERS OF BREAST: ICD-10-CM

## 2023-05-22 PROCEDURE — 99396 PREV VISIT EST AGE 40-64: CPT

## 2023-05-22 RX ORDER — FLUOXETINE 10 MG/1
10 TABLET ORAL
Qty: 30 | Refills: 6 | Status: ACTIVE | COMMUNITY
Start: 2023-05-22 | End: 1900-01-01

## 2023-05-28 PROBLEM — Z80.0 FAMILY HISTORY OF MALIGNANT NEOPLASM OF COLON: Status: ACTIVE | Noted: 2023-05-22

## 2023-05-28 PROBLEM — Z80.0 FAMILY HISTORY OF MALIGNANT NEOPLASM OF STOMACH: Status: ACTIVE | Noted: 2023-05-22

## 2023-05-28 RX ORDER — CHOLECALCIFEROL (VITAMIN D3) 25 MCG
TABLET ORAL
Refills: 0 | Status: ACTIVE | COMMUNITY

## 2023-05-28 RX ORDER — COLD-HOT PACK
50 EACH MISCELLANEOUS
Refills: 0 | Status: ACTIVE | COMMUNITY

## 2023-05-28 RX ORDER — OMEGA-3/DHA/EPA/FISH OIL 300-1000MG
400 CAPSULE ORAL
Refills: 0 | Status: ACTIVE | COMMUNITY

## 2023-05-29 LAB — CYTOLOGY CVX/VAG DOC THIN PREP: NORMAL

## 2023-07-25 ENCOUNTER — APPOINTMENT (OUTPATIENT)
Dept: HEPATOLOGY | Facility: CLINIC | Age: 53
End: 2023-07-25

## 2024-08-14 ENCOUNTER — APPOINTMENT (OUTPATIENT)
Dept: OBGYN | Facility: CLINIC | Age: 54
End: 2024-08-14
Payer: COMMERCIAL

## 2024-08-14 VITALS
WEIGHT: 140 LBS | BODY MASS INDEX: 21.22 KG/M2 | SYSTOLIC BLOOD PRESSURE: 116 MMHG | HEIGHT: 68 IN | DIASTOLIC BLOOD PRESSURE: 72 MMHG

## 2024-08-14 DIAGNOSIS — N92.6 IRREGULAR MENSTRUATION, UNSPECIFIED: ICD-10-CM

## 2024-08-14 DIAGNOSIS — Z01.411 ENCOUNTER FOR GYNECOLOGICAL EXAMINATION (GENERAL) (ROUTINE) WITH ABNORMAL FINDINGS: ICD-10-CM

## 2024-08-14 DIAGNOSIS — N64.89 OTHER SPECIFIED DISORDERS OF BREAST: ICD-10-CM

## 2024-08-14 LAB
BILIRUB UR QL STRIP: NORMAL
GLUCOSE UR-MCNC: NORMAL
HCG UR QL: 0.2 EU/DL
HGB UR QL STRIP.AUTO: NORMAL
KETONES UR-MCNC: NORMAL
LEUKOCYTE ESTERASE UR QL STRIP: NORMAL
NITRITE UR QL STRIP: NORMAL
PH UR STRIP: 5.5
PROT UR STRIP-MCNC: NORMAL
SP GR UR STRIP: 1.02

## 2024-08-14 PROCEDURE — 99396 PREV VISIT EST AGE 40-64: CPT | Mod: 25

## 2024-08-14 PROCEDURE — 81003 URINALYSIS AUTO W/O SCOPE: CPT | Mod: NC,QW

## 2024-08-14 NOTE — PHYSICAL EXAM
[Awake] : awake [Alert] : alert [Soft] : soft [Oriented x3] : oriented to person, place, and time [Normal] : uterus [Uterine Adnexae] : were not tender and not enlarged [Acute Distress] : no acute distress [Mass] : no breast mass [Nipple Discharge] : no nipple discharge [Axillary LAD] : no axillary lymphadenopathy [Tender] : non tender [FreeTextEntry5] : stenotic

## 2024-08-14 NOTE — PLAN
[FreeTextEntry1] : Still w/ menses age 54. Advised TVS to determine endometrial thickness. Consider EMB.

## 2024-08-20 LAB — CYTOLOGY CVX/VAG DOC THIN PREP: NORMAL

## 2025-08-18 ENCOUNTER — APPOINTMENT (OUTPATIENT)
Dept: OBGYN | Facility: CLINIC | Age: 55
End: 2025-08-18
Payer: COMMERCIAL

## 2025-08-18 VITALS
BODY MASS INDEX: 27.23 KG/M2 | HEIGHT: 62 IN | DIASTOLIC BLOOD PRESSURE: 72 MMHG | SYSTOLIC BLOOD PRESSURE: 114 MMHG | WEIGHT: 148 LBS

## 2025-08-18 DIAGNOSIS — N92.1 EXCESSIVE AND FREQUENT MENSTRUATION WITH IRREGULAR CYCLE: ICD-10-CM

## 2025-08-18 DIAGNOSIS — Z01.411 ENCOUNTER FOR GYNECOLOGICAL EXAMINATION (GENERAL) (ROUTINE) WITH ABNORMAL FINDINGS: ICD-10-CM

## 2025-08-18 PROCEDURE — 58100 BIOPSY OF UTERUS LINING: CPT

## 2025-08-18 PROCEDURE — 99396 PREV VISIT EST AGE 40-64: CPT

## 2025-08-21 LAB — CORE LAB BIOPSY: NORMAL

## 2025-08-29 LAB — CYTOLOGY CVX/VAG DOC THIN PREP: ABNORMAL
